# Patient Record
Sex: FEMALE | Race: BLACK OR AFRICAN AMERICAN | Employment: OTHER | URBAN - METROPOLITAN AREA
[De-identification: names, ages, dates, MRNs, and addresses within clinical notes are randomized per-mention and may not be internally consistent; named-entity substitution may affect disease eponyms.]

---

## 2017-09-15 PROBLEM — E11.9 TYPE 2 DIABETES MELLITUS WITHOUT COMPLICATION, WITHOUT LONG-TERM CURRENT USE OF INSULIN (HCC): Status: ACTIVE | Noted: 2017-09-15

## 2018-03-15 PROBLEM — K50.80 CROHN'S DISEASE OF BOTH SMALL AND LARGE INTESTINE WITHOUT COMPLICATION (HCC): Status: ACTIVE | Noted: 2018-03-15

## 2018-08-16 ENCOUNTER — PATIENT OUTREACH (OUTPATIENT)
Dept: CASE MANAGEMENT | Age: 68
End: 2018-08-16

## 2018-08-16 NOTE — PROGRESS NOTES
Medication Adherence Outreach    Date/Time of Call:  8/16/2018  2:25 pm    Name of medication and dosage:   * Lisinopril/HCTZ 20/25 mg 1 every day    Does the patient know the purpose and dosage of medication? * Yes    Are you getting a #30 day or #90 day supply of your medication? * 90 day supply    Are you still taking this medication? If not, why? * Yes    Transportation issues or any problems paying for the medication or other reason? * No    What pharmacy are you using to fill your medication and do you know the last time that you got your medication filled? * [facility]   * Last refill 6/18/2018   Are you having any side effects from taking your medication? * No       Any other questions or concerns expressed by the patient. * No    Comments:     * Discussed medication adherence and the importance of remaining adherent with medication with Ms. Randall Becerra. She states she has no current barriers to prevent her from obtaining or taking her medication.        Call Completed By:  [id number]

## 2019-09-21 ENCOUNTER — HOSPITAL ENCOUNTER (OUTPATIENT)
Dept: MAMMOGRAPHY | Age: 69
Discharge: HOME | End: 2019-09-21
Attending: FAMILY MEDICINE
Payer: MEDICARE

## 2019-09-21 DIAGNOSIS — Z12.39 SCREENING FOR BREAST CANCER: ICD-10-CM

## 2019-09-21 PROCEDURE — 77067 SCR MAMMO BI INCL CAD: CPT

## 2019-09-23 PROBLEM — M48.07 SPINAL STENOSIS OF LUMBOSACRAL REGION: Status: ACTIVE | Noted: 2019-09-23

## 2020-04-03 PROBLEM — I25.10 CAD (CORONARY ARTERY DISEASE): Status: ACTIVE | Noted: 2020-04-03

## 2020-05-29 PROBLEM — D50.0 IRON DEFICIENCY ANEMIA DUE TO CHRONIC BLOOD LOSS: Status: ACTIVE | Noted: 2020-05-29

## 2020-07-17 PROBLEM — K43.9 VENTRAL HERNIA WITHOUT OBSTRUCTION OR GANGRENE: Status: ACTIVE | Noted: 2020-07-17

## 2022-03-19 PROBLEM — E11.9 TYPE 2 DIABETES MELLITUS WITHOUT COMPLICATION, WITHOUT LONG-TERM CURRENT USE OF INSULIN (HCC): Status: ACTIVE | Noted: 2017-09-15

## 2022-03-19 PROBLEM — M48.07 SPINAL STENOSIS OF LUMBOSACRAL REGION: Status: ACTIVE | Noted: 2019-09-23

## 2022-03-19 PROBLEM — K50.80 CROHN'S DISEASE OF BOTH SMALL AND LARGE INTESTINE WITHOUT COMPLICATION (HCC): Status: ACTIVE | Noted: 2018-03-15

## 2022-03-19 PROBLEM — K43.9 VENTRAL HERNIA WITHOUT OBSTRUCTION OR GANGRENE: Status: ACTIVE | Noted: 2020-07-17

## 2022-03-19 PROBLEM — I25.10 CAD (CORONARY ARTERY DISEASE): Status: ACTIVE | Noted: 2020-04-03

## 2022-03-19 PROBLEM — D50.0 IRON DEFICIENCY ANEMIA DUE TO CHRONIC BLOOD LOSS: Status: ACTIVE | Noted: 2020-05-29

## 2024-11-07 ENCOUNTER — HOSPITAL ENCOUNTER (EMERGENCY)
Age: 74
Discharge: HOME | End: 2024-11-07
Attending: EMERGENCY MEDICINE
Payer: MEDICARE

## 2024-11-07 ENCOUNTER — APPOINTMENT (OUTPATIENT)
Dept: GENERAL RADIOLOGY | Age: 74
End: 2024-11-07
Payer: MEDICARE

## 2024-11-07 ENCOUNTER — TELEPHONE (OUTPATIENT)
Dept: ORTHOPEDIC SURGERY | Age: 74
End: 2024-11-07

## 2024-11-07 VITALS
BODY MASS INDEX: 20.72 KG/M2 | TEMPERATURE: 98 F | WEIGHT: 132 LBS | HEIGHT: 67 IN | RESPIRATION RATE: 12 BRPM | HEART RATE: 75 BPM | OXYGEN SATURATION: 98 % | DIASTOLIC BLOOD PRESSURE: 63 MMHG | SYSTOLIC BLOOD PRESSURE: 180 MMHG

## 2024-11-07 DIAGNOSIS — S52.201A CLOSED FRACTURE OF RIGHT RADIUS AND ULNA, INITIAL ENCOUNTER: Primary | ICD-10-CM

## 2024-11-07 DIAGNOSIS — S52.91XA CLOSED FRACTURE OF RIGHT RADIUS AND ULNA, INITIAL ENCOUNTER: Primary | ICD-10-CM

## 2024-11-07 PROCEDURE — 29125 APPL SHORT ARM SPLINT STATIC: CPT

## 2024-11-07 PROCEDURE — 6370000000 HC RX 637 (ALT 250 FOR IP): Performed by: EMERGENCY MEDICINE

## 2024-11-07 PROCEDURE — 99283 EMERGENCY DEPT VISIT LOW MDM: CPT

## 2024-11-07 PROCEDURE — 73110 X-RAY EXAM OF WRIST: CPT

## 2024-11-07 RX ORDER — IBUPROFEN 600 MG/1
600 TABLET, FILM COATED ORAL 3 TIMES DAILY PRN
Qty: 15 TABLET | Refills: 0 | Status: SHIPPED | OUTPATIENT
Start: 2024-11-07 | End: 2024-11-12

## 2024-11-07 RX ORDER — HYDROCODONE BITARTRATE AND ACETAMINOPHEN 5; 325 MG/1; MG/1
1 TABLET ORAL
Status: COMPLETED | OUTPATIENT
Start: 2024-11-07 | End: 2024-11-07

## 2024-11-07 RX ORDER — ACETAMINOPHEN 325 MG/1
650 TABLET ORAL
Status: COMPLETED | OUTPATIENT
Start: 2024-11-07 | End: 2024-11-07

## 2024-11-07 RX ORDER — HYDROCODONE BITARTRATE AND ACETAMINOPHEN 5; 325 MG/1; MG/1
1 TABLET ORAL EVERY 6 HOURS PRN
Qty: 12 TABLET | Refills: 0 | Status: SHIPPED | OUTPATIENT
Start: 2024-11-07 | End: 2024-11-10

## 2024-11-07 RX ORDER — ACETAMINOPHEN 500 MG
1000 TABLET ORAL
Status: DISCONTINUED | OUTPATIENT
Start: 2024-11-07 | End: 2024-11-07

## 2024-11-07 RX ADMIN — HYDROCODONE BITARTRATE AND ACETAMINOPHEN 1 TABLET: 5; 325 TABLET ORAL at 10:24

## 2024-11-07 RX ADMIN — ACETAMINOPHEN 650 MG: 325 TABLET, FILM COATED ORAL at 10:24

## 2024-11-07 ASSESSMENT — PAIN SCALES - GENERAL: PAINLEVEL_OUTOF10: 10

## 2024-11-07 ASSESSMENT — PAIN - FUNCTIONAL ASSESSMENT: PAIN_FUNCTIONAL_ASSESSMENT: 0-10

## 2024-11-07 NOTE — ED PROVIDER NOTES
Emergency Department Provider Note                   PCP:                Jose Alberto Cage MD               Age: 74 y.o.      Sex: female       ICD-10-CM    1. Closed fracture of right radius and ulna, initial encounter  S52.91XA HYDROcodone-acetaminophen (NORCO) 5-325 MG per tablet    S52.201A [facility]          DISPOSITION Decision To Discharge 11/07/2024 10:23:25 AM             MDM  Number of Diagnoses or Management Options  Diagnosis management comments: MEDICAL DECISION MAKING  Complexity of Problems Addressed:  1 or more acute illnesses that pose a threat to life or bodily function.     Data Reviewed and Analyzed:  Category 2:   I interpreted the X-rays Distal radius and ulnar fracture, nondisplaced.    Category 3: Discussion of management or test interpretation.  The patient presents with right wrist pain after a fall.  Imaging shows a right distal radius and ulnar fracture.  The patient is placed in a volar splint.  She is placed in a sling.  Oral analgesia provided.  The patient is referred to orthopedics for follow-up.  Splint instructions given.    Risk of Complications and/or Morbidity of Patient Management:  Prescription drug management performed.                Orders Placed This Encounter   Procedures    SPLINT APPLICATION    XR WRIST RIGHT (MIN 3 VIEWS)    [facility]    [employer] [facility] Arm Sling, Right (); M        Medications   HYDROcodone-acetaminophen (NORCO) 5-325 MG per tablet 1 tablet (1 tablet Oral Given 11/7/24 1024)   acetaminophen (TYLENOL) tablet 650 mg (650 mg Oral Given 11/7/24 1024)       New Prescriptions    HYDROCODONE-ACETAMINOPHEN (NORCO) 5-325 MG PER TABLET    Take 1 tablet by mouth every 6 hours as needed for Pain for up to 3 days. Intended supply: 3 days. Take lowest dose possible to manage pain Max Daily Amount: 4 tablets    IBUPROFEN (ADVIL;MOTRIN) 600 MG TABLET    Take 1 tablet by mouth 3 times  daily as needed for Pain        Lynsey Fuentes is a 74 y.o. female who presents to the Emergency Department with chief complaint of    Chief Complaint   Patient presents with    Arm Pain      The patient presents with right wrist pain. She states she fell yesterday walking up on her porch when she lost her balance. She fell on her outstretched right hand. At the time she felt a pop and had pain in the dorsal wrist.  This morning when she woke up the wrist was swollen.  She waited till about 6 AM and called her family who brought her to the hospital.  She points to the dorsal aspect of the wrist the area of pain.  No numbness or tingling.    The history is provided by the patient. No  was used.         Review of Systems    Past Medical History:   Diagnosis Date    Allergic rhinitis 10/27/2016    Anemia     Arm pain     Arthritis     Bacterial conjunctivitis of left eye     Calcium nephrolithiasis 10/27/2016    CC (Crohn's colitis) ([facility])     Chest congestion 10/27/2016    Chronic pain 10/27/2016    COPD (chronic obstructive pulmonary disease) ([facility]) 10/27/2016    COPD (chronic obstructive pulmonary disease) ([facility])     Crohn disease ([facility])     Diabetes ([facility])     Diabetes mellitus type 2, uncontrolled ([facility]) 10/27/2016    Dyslipidemia 10/27/2016    Excessive daytime sleepiness 10/27/2016    Fatigue     Fibroid uterus 10/27/2016    Generalized abdominal cramping     GERD (gastroesophageal reflux disease) 10/27/2016    Glaucoma 10/27/2016    HTN (hypertension) 10/27/2016    HTN (hypertension)     IBD (inflammatory bowel disease) 10/27/2016    Incontinence in female 10/27/2016    Infusion extravasation of chemotherapy vesicant     Insomnia 10/27/2016    Osteoporosis 10/27/2016    Palpable abdominal mass 10/27/2016    Sinusitis     Sleep difficulties 10/27/2016    Ulcerative colitis (HCC)     Urinary incontinence         Past Surgical History:   Procedure Laterality Date    CATARACT REMOVAL Bilateral     CHEST   Sexually Abused: No   Housing Stability: Not At Risk (11/8/2023)    Received from [facility]    Housing Stability     Was there a time when you did not have a steady place to sleep: No     Worried that the place you are staying is making you sick: No         Cyclobenzaprine and Codeine     Previous Medications    AMLODIPINE (NORVASC) 5 MG TABLET    TAKE 1 TABLET BY MOUTH TWICE A DAY    ASPIRIN PO    Take 81 mg by mouth    BACLOFEN (LIORESAL) 5 MG TABLET    Take 1 tablet by mouth    ERGOCALCIFEROL (ERGOCALCIFEROL) 1.25 MG (51292 UT) CAPSULE    TAKE 1 CAPSULE BY MOUTH ONE TIME PER WEEK    GLIPIZIDE (GLUCOTROL XL) 5 MG EXTENDED RELEASE TABLET    TAKE 1 TAB BY MOUTH TWO (2) TIMES A DAY FOR 90 DAYS. INDICATIONS: TYPE 2 DIABETES MELLITUS    LISINOPRIL-HYDROCHLOROTHIAZIDE (PRINZIDE;ZESTORETIC) 20-12.5 MG PER TABLET    TAKE 1 TABLET BY MOUTH TWICE A DAY    MELOXICAM (MOBIC) 7.5 MG TABLET    TAKE 1 TABLET BY MOUTH TWICE A DAY AS NEEDED FOR ARTHRITIS PAIN    METFORMIN (GLUCOPHAGE-XR) 500 MG EXTENDED RELEASE TABLET    TAKE 1 TAB BY MOUTH THREE (3) TIMES DAILY FOR 90 DAYS. INDICATIONS: TYPE 2 DIABETES MELLITUS    OMEPRAZOLE (PRILOSEC) 20 MG DELAYED RELEASE CAPSULE    Take 20 mg by mouth daily    PREDNISOLONE ACETATE (PRED FORTE) 1 % OPHTHALMIC SUSPENSION    Apply 1 drop to eye    TIMOLOL (TIMOPTIC) 0.5 % OPHTHALMIC SOLUTION    1 drop 2 times daily        Vitals signs and nursing note reviewed.   Patient Vitals for the past 4 hrs:   Temp Pulse Resp BP SpO2   11/07/24 0802 98 °F (36.7 °C) 75 12 (!) 180/63 98 %          Physical Exam  Constitutional:       Appearance: Normal appearance.   HENT:      Head: Atraumatic.   Cardiovascular:      Rate and Rhythm: Normal rate and regular rhythm.      Pulses: Normal pulses.   Pulmonary:      Effort: Pulmonary effort is normal.      Breath sounds: Normal breath sounds.   Musculoskeletal:      Comments: Tenderness to the dorsal right wrist.  Diffuse swelling is noted.  No obvious

## 2024-11-07 NOTE — DISCHARGE INSTRUCTIONS
Take medication as prescribed. Follow up with orthopedics. A referral has been placed to assist you with follow up.

## 2024-11-07 NOTE — CARE COORDINATION
Patient requesting home health services, preference is for [place] . SW discussed with attending MD who is in agreement. Referral sent for PT/OT/RN.     Mar Arroyo, [facility]  Medical Social Worker  [facility]

## 2024-11-07 NOTE — ED TRIAGE NOTES
Patient states she fell yesterday evening off of her front porch. States she is concern for a broken wrist.

## 2024-11-12 ENCOUNTER — OFFICE VISIT (OUTPATIENT)
Age: 74
End: 2024-11-12

## 2024-11-12 DIAGNOSIS — S52.531A CLOSED COLLES' FRACTURE OF RIGHT RADIUS, INITIAL ENCOUNTER: Primary | ICD-10-CM

## 2024-11-12 NOTE — PROGRESS NOTES
Orthopaedic Hand Clinic Note    Name: Lynsey Fuentes  YOB: 1950  Gender: female  MRN: [id number]      CC: Patient referred for evaluation of hand pain    HPI: Lynsey Fuentes is a 74 y.o. female with a chief complaint of right wrist pain. The injury occurred 5 days ago when the patient fell. The pain is located diffusely about the wrist. Denies numbness or paresthesias of the fingers. Evaluation has included x-rays. Treatment to date has included splint.     ROS/Meds/PSH/PMH/FH/SH: I personally reviewed the patients standard intake form.  Pertinents are discussed in the HPI    Physical Examination  Musculoskeletal Exam:  Examination of the right upper extremity demonstrates no open wounds. Sensation is intact throughout, cap refill in all fingers < 5 seconds. Finger motion is limited with moderate swelling of the hand and fingers. Tenderness over right  distal radius. positive tenderness over the ulnar aspect of the wrist. No tenderness at elbow, anatomic snuffbox, hand, digits    Imaging / Electrodiagnostic Tests:     I independently reviewed and interpreted radiographs of the right wrist.  They demonstrate minimally displaced  distal radius fracture, small ulnar styloid fracture.    Assessment:     ICD-10-CM    1. Closed Colles' fracture of right radius, initial encounter  S52.531A Ambulatory Referral to [facility]          Plan:   We discussed the diagnosis and different treatment options. We discussed observation, casting, further imaging, and surgical fixation and the risks, benefits and alternatives of all these options.    After discussing in detail the patient elects to proceed with nonsurgical treatment. Patient will be placed into a removable brace. Brace should remain in place at all times except for hygiene. The patient can perform finger range of motion as tolerated, but should avoid any lifting with the right hand. They will follow up in 2 weeks for repeat radiographs,; she

## 2024-11-13 RX ORDER — HYDROCODONE BITARTRATE AND ACETAMINOPHEN 5; 325 MG/1; MG/1
1 TABLET ORAL EVERY 6 HOURS PRN
Qty: 10 TABLET | Refills: 0 | Status: SHIPPED | OUTPATIENT
Start: 2024-11-13 | End: 2024-11-16

## 2024-11-15 ENCOUNTER — CLINICAL DOCUMENTATION (OUTPATIENT)
Dept: ORTHOPEDIC SURGERY | Age: 74
End: 2024-11-15

## 2024-11-19 ENCOUNTER — CLINICAL DOCUMENTATION (OUTPATIENT)
Dept: ORTHOPEDIC SURGERY | Age: 74
End: 2024-11-19

## 2024-12-05 ENCOUNTER — OFFICE VISIT (OUTPATIENT)
Age: 74
End: 2024-12-05
Payer: MEDICARE

## 2024-12-05 DIAGNOSIS — S52.531A CLOSED COLLES' FRACTURE OF RIGHT RADIUS, INITIAL ENCOUNTER: Primary | ICD-10-CM

## 2024-12-05 PROCEDURE — G8400 PT W/DXA NO RESULTS DOC: HCPCS | Performed by: ORTHOPAEDIC SURGERY

## 2024-12-05 PROCEDURE — 99213 OFFICE O/P EST LOW 20 MIN: CPT | Performed by: ORTHOPAEDIC SURGERY

## 2024-12-05 PROCEDURE — 4004F PT TOBACCO SCREEN RCVD TLK: CPT | Performed by: ORTHOPAEDIC SURGERY

## 2024-12-05 PROCEDURE — 1090F PRES/ABSN URINE INCON ASSESS: CPT | Performed by: ORTHOPAEDIC SURGERY

## 2024-12-05 PROCEDURE — 1123F ACP DISCUSS/DSCN MKR DOCD: CPT | Performed by: ORTHOPAEDIC SURGERY

## 2024-12-05 PROCEDURE — G8484 FLU IMMUNIZE NO ADMIN: HCPCS | Performed by: ORTHOPAEDIC SURGERY

## 2024-12-05 PROCEDURE — G8420 CALC BMI NORM PARAMETERS: HCPCS | Performed by: ORTHOPAEDIC SURGERY

## 2024-12-05 PROCEDURE — G8428 CUR MEDS NOT DOCUMENT: HCPCS | Performed by: ORTHOPAEDIC SURGERY

## 2024-12-05 NOTE — PROGRESS NOTES
Orthopaedic Hand Clinic Note    Name: Lynsey Fuentes  YOB: 1950  Gender: female  MRN: [id number]      Follow up visit:   1. Closed Colles' fracture of right radius, initial encounter        HPI: Lynsey Fuentes is a 74 y.o. female who is following up for right distal radius fracture. Pain is improving. She also has worsening of clicking and locking of her right middle finger since her fall.      ROS/Meds/PSH/PMH/FH/SH: I personally reviewed the patients standard intake form.  Pertinents are discussed in the HPI    Physical Examination:    Musculoskeletal Examination:  Examination of the right upper extremity demonstrates no open wounds. Sensation is intact throughout, cap refill in all fingers < 5 seconds. Finger motion is limited with moderate swelling of the hand and fingers. Tenderness over right  distal radius. positive tenderness over the ulnar aspect of the wrist.  There is tenderness to palpation and positive locking at the middle A1 pulley    Imaging / Electrodiagnostic Tests:     Wrist XR: AP, Lateral, Oblique views     Clinical Indication:  1. Closed Colles' fracture of right radius, initial encounter           Report: AP, lateral, oblique x-ray of the right wrist demonstrates minimally displaced distal radius fracture and small ulnar styloid fracture, no change in alignment    Impression: as above     Nydia Graham MD         Assessment:     ICD-10-CM    1. Closed Colles' fracture of right radius, initial encounter  S52.531A XR WRIST RIGHT (MIN 3 VIEWS)          Plan:   We discussed the diagnosis and different treatment options. We discussed observation, therapy, antiinflammatory medications and other pertinent treatment modalities.    After discussing in detail the patient elects to proceed with continued use of her brace at all times except for hygiene.  She can perform finger range of motion as tolerated.  She would also like a cortisone injection for the trigger finger.